# Patient Record
Sex: MALE | NOT HISPANIC OR LATINO
[De-identification: names, ages, dates, MRNs, and addresses within clinical notes are randomized per-mention and may not be internally consistent; named-entity substitution may affect disease eponyms.]

---

## 2021-11-08 ENCOUNTER — P2P PATIENT RECORD (OUTPATIENT)
Age: 76
End: 2021-11-08

## 2021-11-12 ENCOUNTER — P2P PATIENT RECORD (OUTPATIENT)
Age: 76
End: 2021-11-12

## 2021-11-15 ENCOUNTER — LAB OUTSIDE AN ENCOUNTER (OUTPATIENT)
Dept: URBAN - NONMETROPOLITAN AREA CLINIC 2 | Facility: CLINIC | Age: 76
End: 2021-11-15

## 2021-11-15 ENCOUNTER — DASHBOARD ENCOUNTERS (OUTPATIENT)
Age: 76
End: 2021-11-15

## 2021-11-15 ENCOUNTER — OFFICE VISIT (OUTPATIENT)
Dept: URBAN - NONMETROPOLITAN AREA CLINIC 2 | Facility: CLINIC | Age: 76
End: 2021-11-15
Payer: MEDICARE

## 2021-11-15 ENCOUNTER — TELEPHONE ENCOUNTER (OUTPATIENT)
Dept: URBAN - METROPOLITAN AREA CLINIC 92 | Facility: CLINIC | Age: 76
End: 2021-11-15

## 2021-11-15 ENCOUNTER — WEB ENCOUNTER (OUTPATIENT)
Dept: URBAN - NONMETROPOLITAN AREA CLINIC 2 | Facility: CLINIC | Age: 76
End: 2021-11-15

## 2021-11-15 VITALS
BODY MASS INDEX: 23.49 KG/M2 | SYSTOLIC BLOOD PRESSURE: 155 MMHG | TEMPERATURE: 97.1 F | HEIGHT: 65 IN | HEART RATE: 105 BPM | DIASTOLIC BLOOD PRESSURE: 75 MMHG | WEIGHT: 141 LBS

## 2021-11-15 DIAGNOSIS — C20 RECTAL CANCER: ICD-10-CM

## 2021-11-15 PROBLEM — 363351006: Status: ACTIVE | Noted: 2021-11-15

## 2021-11-15 PROCEDURE — 99204 OFFICE O/P NEW MOD 45 MIN: CPT | Performed by: NURSE PRACTITIONER

## 2021-11-15 RX ORDER — ATORVASTATIN CALCIUM 20 MG/1
TAKE 1 TABLET BY MOUTH EVERY EVENING FOR CHOLESTEROL TABLET ORAL
Qty: 90 | Refills: 1 | Status: ACTIVE | COMMUNITY

## 2021-11-15 RX ORDER — PREDNISONE 2.5 MG/1
TABLET ORAL
Qty: 30 | Status: ACTIVE | COMMUNITY

## 2021-11-15 RX ORDER — SODIUM BICARBONATE 650 MG/1
TAKE 1 TABLET TWICE A DAY BY ORAL ROUTE AS DIRECTED FOR 90 DAYS. DIAGNOSIS UNAVAILABLE TABLET ORAL
Qty: 180 | Refills: 0 | Status: ACTIVE | COMMUNITY

## 2021-11-15 RX ORDER — MEGESTROL ACETATE 20 MG/1
TAKE 1 TABLET TWICE A DAY BY ORAL ROUTE WITH MEALS FOR 90 DAYS. DIAGNOSIS UNAVAILABLE TABLET ORAL
Qty: 180 | Refills: 0 | Status: ACTIVE | COMMUNITY

## 2021-11-15 RX ORDER — OMEPRAZOLE 20 MG/1
TAKE 1 CAPSULE EVERY DAY BY ORAL ROUTE IN THE MORNING FOR 90 DAYS. DIAGNOSIS UNAVAILABLE CAPSULE, DELAYED RELEASE ORAL
Qty: 90 | Refills: 0 | Status: ACTIVE | COMMUNITY

## 2021-11-15 RX ORDER — AMLODIPINE BESYLATE 5 MG/1
TAKE 1 TABLET EVERY DAY BY ORAL ROUTE IN THE MORNING FOR 90 DAYS. DIAGNOSIS UNAVAILABLE TABLET ORAL
Qty: 90 | Refills: 0 | Status: ACTIVE | COMMUNITY

## 2021-11-15 RX ORDER — ALLOPURINOL 300 MG/1
TAKE 1 TABLET BY MOUTH EVERY MORNING TABLET ORAL
Qty: 90 | Refills: 1 | Status: ACTIVE | COMMUNITY
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
## 2021-11-15 NOTE — PHYSICAL EXAM CONSTITUTIONAL:
well developed, thin, in no acute distress , ambulating with difficulty, use of a cane , normal communication ability

## 2021-11-19 ENCOUNTER — OFFICE VISIT (OUTPATIENT)
Dept: URBAN - METROPOLITAN AREA MEDICAL CENTER 1 | Facility: MEDICAL CENTER | Age: 76
End: 2021-11-19
Payer: MEDICARE

## 2021-11-19 DIAGNOSIS — C20 ADENOCARCINOMA OF RECTUM: ICD-10-CM

## 2021-11-19 PROCEDURE — 45341 SIGMOIDOSCOPY W/ULTRASOUND: CPT | Performed by: INTERNAL MEDICINE

## 2021-11-23 ENCOUNTER — WEB ENCOUNTER (OUTPATIENT)
Dept: URBAN - NONMETROPOLITAN AREA CLINIC 2 | Facility: CLINIC | Age: 76
End: 2021-11-23